# Patient Record
(demographics unavailable — no encounter records)

---

## 2025-06-25 NOTE — HISTORY OF PRESENT ILLNESS
[TextBox_4] : patient is a 50 complaints regarding her paragard IUD at this time. She denies any GYN complaints at this time she is due for colonoscopy she has never had a routine colonoscopy.  periods stopped for a few months now are back coming monthly  states she is having VMS which are tolerable at this time.

## 2025-06-25 NOTE — PHYSICAL EXAM
[FreeTextEntry2] : Tara GARDINER chaperoned during entire physical exam, [Appropriately responsive] : appropriately responsive [Alert] : alert [No Acute Distress] : no acute distress [No Lymphadenopathy] : no lymphadenopathy [Soft] : soft [Non-tender] : non-tender [Non-distended] : non-distended [No HSM] : No HSM [No Lesions] : no lesions [No Mass] : no mass [Oriented x3] : oriented x3 [Examination Of The Breasts] : a normal appearance [No Discharge] : no discharge [No Masses] : no breast masses were palpable [Labia Majora] : normal [Labia Minora] : normal [IUD String] : an IUD string was noted [Normal] : normal [Uterine Adnexae] : normal

## 2025-06-25 NOTE — PLAN
[FreeTextEntry1] : Patient to follow up in 1 year for annual GYN exam Mammogram and bilateral breast US due: 10/2025 Rx given  Colonoscopy due:  November 2028 +polyp 5 year follow up  Bone density due:  55  Pap ordered Educated that 1 year no period is menopause, she is to call me if she bleeds after 1 year.  Hemoccult ordered  All questions answered, patient agreeable with plan.  I Tara GARDINER am scribing for the presence of Dr. Bolanos the following sections HISTORY OF PRESENT ILLNESS, PAST MEDICAL/FAMILY/SOCIAL HISTORY; REVIEW OF SYSTEMS; VITAL SIGNS; PHYSICAL EXAM; DISPOSITION.  I personally performed the services described in the documentation, reviewed the documentation recorded by the scribe in my presence and it accurately and completely records my words and actions.